# Patient Record
Sex: MALE | Race: AMERICAN INDIAN OR ALASKA NATIVE
[De-identification: names, ages, dates, MRNs, and addresses within clinical notes are randomized per-mention and may not be internally consistent; named-entity substitution may affect disease eponyms.]

---

## 2019-01-08 NOTE — EMERGENCY DEPARTMENT REPORT
Minor Respiratory





- HPI


Chief Complaint: Adult Asthma


Stated Complaint: SOB


Time Seen by Provider: 01/08/19 10:42


Duration: 3 Days


Pain Location: Chest


Severity: mild


Minor Respiratory: Yes Able to Tolerate Fluids, Yes Cough, No Rhinorrhea, No 

Sore Throat, No Ear Pain, No Sick Contacts, No Hemoptysis, No Chest Pain, No 

Shortness of Breath, No Fever


Other History: Patient is a 38-year-old -American male who comes in today

complaining of shortness of breath and wheezing for 2 days.  Patient has a past 

medical history of asthma and he is out of his inhalers and has been for some 

time.  Patient denies any other major medical problems.  And is on no daily 

medications.no fever.





ED Review of Systems


ROS: 


Stated complaint: SOB


Other details as noted in HPI





Comment: All other systems reviewed and negative


Constitutional: see HPI.  denies: chills


Eyes: denies: eye pain


ENT: as per HPI.  denies: throat pain


Respiratory: no symptoms reported, see HPI, cough, shortness of breath, wheezing


Cardiovascular: denies: chest pain, palpitations


Endocrine: denies: flushing


Gastrointestinal: denies: abdominal pain


Genitourinary: denies: urgency


Musculoskeletal: denies: back pain


Skin: denies: lesions


Neurological: denies: weakness


Psychiatric: denies: depression


Hematological/Lymphatic: denies: easy bleeding





ED Past Medical Hx





- Past Medical History


Previous Medical History?: Yes


Hx Asthma: Yes





- Surgical History


Past Surgical History?: Yes


Additional Surgical History: bilat femur surgery





- Social History


Smoking Status: Current Every Day Smoker





- Medications


Home Medications: 


                                Home Medications











 Medication  Instructions  Recorded  Confirmed  Last Taken  Type


 


ALBUTEROL Inhaler (OR & NICU) 2 puff IH QID PRN #1 inhalation 01/08/19  Unknown 

Rx





[ProAir HFA Inhaler]     


 


Amoxicillin 500 mg PO BID #20 capsule 01/08/19  Unknown Rx


 


Cetirizine HCl [ZyrTEC] 10 mg PO DAILY #20 capsule 01/08/19  Unknown Rx


 


Fluticasone [Flonase] 1 spray NS QDAY #1 bottle 01/08/19  Unknown Rx


 


predniSONE [Deltasone] 20 mg PO DAILY #5 tablet 01/08/19  Unknown Rx














Minor Respiratory Exam





- Exam


General: 


Vital signs noted. No distress. Alert and acting appropriately.





HEENT: Yes Moist Mucous Membranes, No Pharyngeal Erythema, No Pharyngeal 

Exudates, No Rhinorrhea, No Conjuctival Injection, No Frontal Tenderness, No 

Maxillary Tenderness


Ear: Neither TM Bulge, Neither TM Erythema, Neither EAC Pain, Neither EAC 

Discharge


Neck: Yes Supple, No Adenopathy


Lungs: Yes Good Air Exchange, Yes Wheezes (b), Yes Cough, No Ronchi, No Stridor,

 No Labored Respirations, No Retractions, No Use of Accessory Muscles, No Other 

Abnormal Lung Sounds


Heart: Yes Regular (hr 90 on exam), No Murmur


Abdomen: Yes Normal Bowel Sounds, No Tenderness, No Peritoneal Signs


Skin: No Rash, No Edema


Neurologic: 


Alert and oriented, no deficits.








Musculoskeletal: 


Unremarkable.











ED Course


                                   Vital Signs











  01/08/19





  10:17


 


Temperature 98.8 F


 


Pulse Rate 115 H


 


Respiratory 22





Rate 


 


Blood Pressure 137/90


 


O2 Sat by Pulse 97





Oximetry 














ED Medical Decision Making





- Medical Decision Making





non toxic


ambulatory


taking po


no fever








- Differential Diagnosis


asthma w or wo ae


Critical care attestation.: 


If time is entered above; I have spent that time in minutes in the direct care 

of this critically ill patient, excluding procedure time.








ED Disposition


Clinical Impression: 


 Asthma with acute exacerbation, URTI (acute upper respiratory infection)





Disposition: DC-01 TO HOME OR SELFCARE


Is pt being admited?: No


Does the pt Need Aspirin: No


Condition: Stable


Instructions:  Asthma (ED)


Additional Instructions: 


rest





hydrate well





meds as ordered today





follow up pcp in 48 hours if not better


referral below


Referrals: 


BARI GO MD [Primary Care Provider] - 3-5 Days


EVELIN SAAVEDRA MD [Staff Physician] - 3-5 Days


Time of Disposition: 10:56

## 2021-09-23 ENCOUNTER — HOSPITAL ENCOUNTER (EMERGENCY)
Dept: HOSPITAL 5 - ED | Age: 41
Discharge: HOME | End: 2021-09-23
Payer: COMMERCIAL

## 2021-09-23 VITALS — SYSTOLIC BLOOD PRESSURE: 134 MMHG | DIASTOLIC BLOOD PRESSURE: 78 MMHG

## 2021-09-23 DIAGNOSIS — F17.200: ICD-10-CM

## 2021-09-23 DIAGNOSIS — Z98.890: ICD-10-CM

## 2021-09-23 DIAGNOSIS — M54.16: Primary | ICD-10-CM

## 2021-09-23 DIAGNOSIS — M54.5: ICD-10-CM

## 2021-09-23 DIAGNOSIS — J45.909: ICD-10-CM

## 2021-09-23 PROCEDURE — 99282 EMERGENCY DEPT VISIT SF MDM: CPT

## 2021-09-23 PROCEDURE — 96372 THER/PROPH/DIAG INJ SC/IM: CPT

## 2021-09-29 ENCOUNTER — HOSPITAL ENCOUNTER (OUTPATIENT)
Dept: HOSPITAL 5 - XRAY | Age: 41
Discharge: HOME | End: 2021-09-29
Attending: ORTHOPAEDIC SURGERY
Payer: COMMERCIAL

## 2021-09-29 DIAGNOSIS — M79.651: Primary | ICD-10-CM

## 2021-09-29 DIAGNOSIS — M79.652: ICD-10-CM

## 2021-09-29 NOTE — XRAY REPORT
BILATERAL FEMURS AP VIEWS



INDICATION / CLINICAL INFORMATION:

PAIN IN UNSPECIFIED THIGH



COMPARISON:

None available.

 

FINDINGS:



BONES and JOINT(S): No acute fracture or subluxation. Bilateral internal fixation of the femurs appea
rs intact and in good position. There are old fractures of the proximal third of the left femoral sha
ft and distal third of the right femoral shaft. No significant arthritis.

SOFT TISSUES: No significant abnormality.



ADDITIONAL FINDINGS: None.



IMPRESSION:

1. No acute findings.

2. Additional findings as above.



Signer Name: Jimbo Rodas MD 

Signed: 9/29/2021 2:46 PM

Workstation Name: [id number]

## 2021-10-15 LAB
HCT VFR BLD CALC: 41.4 % (ref 35.5–45.6)
HGB BLD-MCNC: 14.3 GM/DL (ref 11.8–15.2)
MCHC RBC AUTO-ENTMCNC: 35 % (ref 32–34)
MCV RBC AUTO: 94 FL (ref 84–94)
PLATELET # BLD: 298 K/MM3 (ref 140–440)
RBC # BLD AUTO: 4.4 M/MM3 (ref 3.65–5.03)

## 2021-10-15 NOTE — ANESTHESIA CONSULTATION
Anesthesia Consult and Med Hx


Date of service: 10/15/21





- Airway


Anesthetic Teeth Evaluation: Good, Dentures (upper)


ROM Head & Neck: Adequate


Mental/Hyoid Distance: Adequate


Mallampati Class: Class III


Intubation Access Assessment: Possibly Difficult





- Pulmonary Exam


CTA: Yes





- Cardiac Exam


Cardiac Exam: RRR





- Pre-Operative Health Status


ASA Pre-Surgery Classification: ASA2


Proposed Anesthetic Plan: General





- Pulmonary


Hx Smoking: Yes (1/2PPD)


Hx Asthma: Yes (no inhaler use in >1month; triggered by dust, allergies)


Hx Respiratory Symptoms: No





- Cardiovascular System


Hx Hypertension: No


Hx Heart Attack/AMI: No


Hx Percutaneous Transluminal Coronary Angioplasty (PTCA): No


Hx Cardia Arrhythmia: No





- Central Nervous System


CVA: No


Hx Back Pain: Yes





- Endocrine


Hx Renal Disease: No


Hx Liver Disease: No


Hx Insulin Dependent Diabetes: No


Hx Non-Insulin Dependent Diabetes: No


Hx Thyroid Disease: No





- Other Systems


Hx Alcohol Use: Yes (6 beers per day)


Hx Obesity: No





- Additional Comments


Anesthesia Medical History Comments: No hx anesthetic complications.

## 2021-10-18 ENCOUNTER — HOSPITAL ENCOUNTER (OUTPATIENT)
Dept: HOSPITAL 5 - OR | Age: 41
Discharge: HOME | End: 2021-10-18
Attending: ORTHOPAEDIC SURGERY
Payer: COMMERCIAL

## 2021-10-18 VITALS — DIASTOLIC BLOOD PRESSURE: 94 MMHG | SYSTOLIC BLOOD PRESSURE: 150 MMHG

## 2021-10-18 DIAGNOSIS — J45.909: ICD-10-CM

## 2021-10-18 DIAGNOSIS — Z20.822: ICD-10-CM

## 2021-10-18 DIAGNOSIS — Z79.899: ICD-10-CM
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
## 2021-10-18 DIAGNOSIS — F17.210: ICD-10-CM

## 2021-10-18 DIAGNOSIS — Z98.890: ICD-10-CM

## 2021-10-18 DIAGNOSIS — T84.84XA: Primary | ICD-10-CM

## 2021-10-18 PROCEDURE — 20680 REMOVAL OF IMPLANT DEEP: CPT

## 2021-10-18 PROCEDURE — 36415 COLL VENOUS BLD VENIPUNCTURE: CPT

## 2021-10-18 PROCEDURE — U0003 INFECTIOUS AGENT DETECTION BY NUCLEIC ACID (DNA OR RNA); SEVERE ACUTE RESPIRATORY SYNDROME CORONAVIRUS 2 (SARS-COV-2) (CORONAVIRUS DISEASE [COVID-19]), AMPLIFIED PROBE TECHNIQUE, MAKING USE OF HIGH THROUGHPUT TECHNOLOGIES AS DESCRIBED BY CMS-2020-01-R: HCPCS

## 2021-10-18 PROCEDURE — 81025 URINE PREGNANCY TEST: CPT

## 2021-10-18 PROCEDURE — 93005 ELECTROCARDIOGRAM TRACING: CPT

## 2021-10-18 PROCEDURE — 73560 X-RAY EXAM OF KNEE 1 OR 2: CPT

## 2021-10-18 PROCEDURE — 85027 COMPLETE CBC AUTOMATED: CPT

## 2021-10-18 NOTE — XRAY REPORT
INTRAOPERATIVE FLUOROSCOPY



INDICATION / CLINICAL INFORMATION:

RIGHT KNEE PAIN.. 



TECHNIQUE:

Intraoperative spot images were obtained during the procedure.



FINDINGS:

Intraoperative fluoroscopy images



See operative/procedure note by performing physician for full details.



Fluoroscopy Time: 0.7 minutes. Fluoroscopy Images: 2.



Signer Name: Yfn Navas MD 

Signed: 10/18/2021 10:47 AM

Workstation Name: [id number]

## 2021-10-18 NOTE — OPERATIVE REPORT
Operative Report


Operative Report: 


Preop diagnosis : Retained painful hardware , right distal femur





Postop diagnosis: Retained painful hardware, right distal femur





Procedure: Movable of 2 (most distal)  locking screws, right distal femur





Surgeon: Terry Shirley MD





Anesthesia: General with LMA





Details of operative technique: The patient was prepared in the same-day holding

area.  He was then brought to the operating room where he underwent general 

anesthesia utilizing an LMA.  He was placed in the supine position and the right

distal thigh and knee were prepped and draped in the usual sterile fashion with 

ChloraPrep solution.  A tourniquet was applied but was not utilized.  A timeout 

was then called by the circulating nurse and once again the correct site was 

identified.


This Patient had undergone a cephalocaudal intramedullary nail fixation for a 

distal femoral shaft fracture in the remote past.  There were 2 small incisions 

in the distal thigh for the most distal of the locking screws.  There was a 

total of 4 distal locking screws 2 in the metadiaphysis which were perfectly 

normal (and left alone) and 2 in the more distal locking holes at the epicondyle

area of the distal femur.  It was these 2 the most distal,  which were prominent

both medially and laterally over the epicondylar area.  Preop radiographs show 

that the screws had migrated possibly a few millimeters which now caused them to

impinge on the soft tissue in the epicondylar area of the knee where there is a 

very thin fibrofatty layer.


Utilizing C arm images during the case, the most distal screw was addressed 

first and a small incision was made over the lateral epicondylar area in line 

with the head of the cannulated cancellous screw.  This appeared to be the 

longer screw and also appeared to be a 5.5 mm diameter screw that was removed 

after localizing with C arm from the lateral approach.


The proximal epicondylar screw had been placed from medial to lateral and so a 

slightly larger incision approximately 2 cm was made on the medial side of the 

epicondyle expanding the original incision by half.  Stasis was secured with the

Bovie.  Fibrous tissue and bone remodeling from the protrusion of the head of 

the screw which created a fibro-osseous roof.  Once localized utilizing C arm 

the screw was removed with a pair of large pliers.  C arm fluoro images 

confirmed both distal screws that were located in the epicondylar area of the 

distal femur had been successfully removed.


The wounds were then irrigated copiously with normal saline.  Lateral wound 

required only skin closure utilizing 2-0 nylon interrupted sutures.  The medial 

incision was more extensive with more soft tissue dissection and therefore the 

subcu layer was closed with 0 Vicryl suture and the skin was reapproximated with

both a mattress and an  interrupted suture utilizing 2-0 nylon.  Sterile 

compressive dressings were placed over both incisions including Xeroform dressi

ngs.  These were secured with an ABD pad and an Ace wrap.


Patient was then awakened and taken to recovery room in excellent condition.





Estimated blood loss: Less than 20 cc





Drains: None


Complications: None


Tourniquet time: Not utilized

## 2021-10-21 NOTE — ELECTROCARDIOGRAPH REPORT
[facility]

                                       

Test Date:    2021-10-15               Test Time:    09:36:13

Pat Name:     SHEKHAR KOTHARI           Department:   

Patient ID:   [id number]          Room:          

Gender:       M                        Technician:   SC

:          1980               Requested By: FRANKI MUÑOZ

Order Number: [id number]              Reading MD:   Lakhwinder Madrid

                                 Measurements

Intervals                              Axis          

Rate:         86                       P:            83

VA:           137                      QRS:          76

QRSD:         93                       T:            50

QT:           357                                    

QTc:          428                                    

                           Interpretive Statements

Sinus rhythm

Probable left atrial enlargement

Otherwise, normal ECG

No previous ECG available for comparison

Electronically Signed On 10- 9:45:50 EDT by Lakhwinder Madrid

## 2022-07-26 ENCOUNTER — HOSPITAL ENCOUNTER (EMERGENCY)
Dept: HOSPITAL 5 - ED | Age: 42
LOS: 1 days | Discharge: LEFT BEFORE DISCHARGE | End: 2022-07-27
Payer: COMMERCIAL

## 2022-07-26 VITALS — DIASTOLIC BLOOD PRESSURE: 107 MMHG | SYSTOLIC BLOOD PRESSURE: 169 MMHG

## 2022-07-26 DIAGNOSIS — F10.20: ICD-10-CM

## 2022-07-26 DIAGNOSIS — M79.673: Primary | ICD-10-CM

## 2022-07-26 DIAGNOSIS — F17.200: ICD-10-CM

## 2022-07-26 DIAGNOSIS — J45.909: ICD-10-CM

## 2022-07-26 PROCEDURE — 99281 EMR DPT VST MAYX REQ PHY/QHP: CPT

## 2022-07-26 NOTE — EMERGENCY DEPARTMENT REPORT
ED General Adult HPI





- General


Stated complaint: NUMBNESS IN BOTH FEET


PUI?: No


Time Seen by Provider: 07/26/22 11:10


Source: patient


Mode of arrival: Ambulatory


Limitations: No Limitations





- History of Present Illness


Initial comments: 





Patient is a 42-year-old male that comes to the emergency room with bilateral 

foot numbness.  He was a patient of Dr. Edwards and had surgery in October for 

retained hardware in his right hip.  See EMR.  He states that since that time 

this numbness of his feet has increased.  He denies incontinence.  He is 

ambulatory to the ER.  On no home medications.  Does not have a primary care 

doctor.


-: Gradual


Consistency: constant


Improves with: none


Worsens with: none


Associated Symptoms: denies other symptoms


Treatments Prior to Arrival: none





- Related Data


                                    Allergies











Allergy/AdvReac Type Severity Reaction Status Date / Time


 


No Known Allergies Allergy   Verified 10/13/21 12:44














ED Review of Systems


ROS: 


Stated complaint: NUMBNESS IN BOTH FEET


Other details as noted in HPI





Comment: All other systems reviewed and negative





ED Past Medical Hx





- Past Medical History


Previous Medical History?: Yes


Hx Headaches / Migraines: Yes (MIGRAINES)


Hx Asthma: Yes


Hx HIV: No





- Surgical History


Past Surgical History?: Yes


Additional Surgical History: bilat femur surgery





- Social History


Smoking Status: Current Every Day Smoker


Substance Use Type: Alcohol





ED Physical Exam





- General


General appearance: alert, in no apparent distress





- Head


Head exam: Present: atraumatic, normocephalic





- Eye


Eye exam: Present: normal appearance





- ENT


ENT exam: Present: mucous membranes moist





- Neck


Neck exam: Present: normal inspection





- Respiratory


Respiratory exam: Present: normal lung sounds bilaterally.  Absent: respiratory 

distress





- Cardiovascular


Cardiovascular Exam: Present: regular rate, normal rhythm.  Absent: systolic 

murmur, diastolic murmur, rubs, gallop





- GI/Abdominal


GI/Abdominal exam: Present: soft, normal bowel sounds





- Rectal


Rectal exam: Present: deferred





- Extremities Exam


Extremities exam: Present: normal inspection





- Back Exam


Back exam: Present: normal inspection





- Neurological Exam


Neurological exam: Present: alert, oriented X3





- Psychiatric


Psychiatric exam: Present: normal affect, normal mood





- Skin


Skin exam: Present: warm, dry, intact, normal color.  Absent: rash





ED Course


                                   Vital Signs











  07/26/22





  11:10


 


Temperature 98.5 F


 


Pulse Rate 93 H


 


Respiratory 18





Rate 


 


Blood Pressure 169/107


 


O2 Sat by Pulse 99





Oximetry 











Critical care attestation.: 


If time is entered above; I have spent that time in minutes in the direct care 

of this critically ill patient, excluding procedure time.








ED Disposition


Clinical Impression: 


 Foot pain





Disposition: 07 LEFT WITHOUT BEING SEEN


Is pt being admited?: No


Does the pt Need Aspirin: No


Condition: Stable


Referrals: 


PRIMARY CARE,MD [Primary Care Provider] - 3-5 Days

## 2024-11-06 NOTE — EMERGENCY DEPARTMENT REPORT
ED Back Pain/Injury HPI





- General


Chief Complaint: Extremity Problem,Nontraumatic


Stated Complaint: LOSING FEELINGS IN BOTH LEGS


Time Seen by Provider: 09/23/21 14:55


Source: patient


Limitations: No Limitations





- History of Present Illness


Initial Comments: 





This is a 41-year-old male nontoxic, well nourished in appearance, no acute 

signs of distress presents to the ED with c/o of acute on chronic lower back 

pain with radiation to bilateral legs.  Patient stated had a accident in the 

year 2007 and had bilateral surgical procedures to femur.  Patient otherwise 

denies any other injuries or symptoms.  Patient stated was just seen a provider 

in urgent care and was given medicartions which made his symptoms better and was

diagnosed radiculopathy and has a orthopedic appointment this Monday in 4 days. 

Patient stated taht he went to work today and developed pains to lower back with

radiation to bilateral legs. Patient states has history of sciatica nerve pain 

which is similar symptoms as today.  Patient states that pain radiates through 

to his bilateral lower extremity.  Patient denies any new injuries or trauma.  

Denies any bladder or bowel instability.  Patient denies any urinary symptoms.  

Denies any fever, chills, nausea, vomiting, headache, stiff neck, chest pain or 

shortness of breath.  Denies any allergies.  


MD Complaint: back pain


-: days(s)


Similar Symptoms Previously: Yes


Place: work


Radiation: left leg, right leg


Severity: mild


Severity scale (0 -10): 3


Quality: aching


Consistency: intermittent


Improves With: immobilization, sitting upright


Worsens With: movement, walking


Associated Symptoms: denies other symptoms.  denies: confusion, weakness, chest 

pain, numbness, difficulty walking, cough, difficulty urinating, diaphoresis, 

incontinence, fever/chills, constipation, headaches, abdominal pain, malaise, 

nausea/vomiting, rash, seizure, shortness of breath, syncope





- Related Data


                                  Previous Rx's











 Medication  Instructions  Recorded  Last Taken  Type


 


Albuterol Mdi (or & Nicu Only) 2 puff IH QID PRN #1 inhalation 01/08/19 Unknown 

Rx





[ProAir HFA Inhaler]    


 


Amoxicillin 500 mg PO BID #20 capsule 01/08/19 Unknown Rx


 


Cetirizine HCl [ZyrTEC] 10 mg PO DAILY #20 capsule 01/08/19 Unknown Rx


 


Fluticasone [Flonase] 1 spray NS QDAY #1 bottle 01/08/19 Unknown Rx


 


predniSONE [Deltasone] 20 mg PO DAILY #5 tablet 01/08/19 Unknown Rx











                                    Allergies











Allergy/AdvReac Type Severity Reaction Status Date / Time


 


No Known Allergies Allergy   Verified 09/29/16 17:51














ED Review of Systems


ROS: 


Stated complaint: LOSING FEELINGS IN BOTH LEGS


Other details as noted in HPI





Comment: All other systems reviewed and negative


Constitutional: denies: chills, fever


Eyes: denies: eye pain, eye discharge, vision change


ENT: denies: ear pain, throat pain


Respiratory: denies: cough, shortness of breath, wheezing


Cardiovascular: denies: chest pain, palpitations


Endocrine: no symptoms reported


Gastrointestinal: denies: abdominal pain, nausea, diarrhea


Genitourinary: denies: urgency, dysuria


Musculoskeletal: back pain.  denies: joint swelling, arthralgia


Skin: denies: rash, lesions


Neurological: denies: headache, weakness, paresthesias


Psychiatric: denies: anxiety, depression


Hematological/Lymphatic: denies: easy bleeding, easy bruising





ED Past Medical Hx





- Past Medical History


Previous Medical History?: Yes


Hx Asthma: Yes





- Surgical History


Additional Surgical History: bilat femur surgery





- Social History


Smoking Status: Current Every Day Smoker





- Medications


Home Medications: 


                                Home Medications











 Medication  Instructions  Recorded  Confirmed  Last Taken  Type


 


Albuterol Mdi (or & Nicu Only) 2 puff IH QID PRN #1 inhalation 01/08/19  Unknown

 Rx





[ProAir HFA Inhaler]     


 


Amoxicillin 500 mg PO BID #20 capsule 01/08/19  Unknown Rx


 


Cetirizine HCl [ZyrTEC] 10 mg PO DAILY #20 capsule 01/08/19  Unknown Rx


 


Fluticasone [Flonase] 1 spray NS QDAY #1 bottle 01/08/19  Unknown Rx


 


predniSONE [Deltasone] 20 mg PO DAILY #5 tablet 01/08/19  Unknown Rx














ED Physical Exam





- General


Limitations: No Limitations


General appearance: alert, in no apparent distress





- Head


Head exam: Present: atraumatic, normocephalic





- Eye


Eye exam: Present: normal appearance





- Neck


Neck exam: Present: normal inspection, full ROM.  Absent: lymphadenopathy





- Respiratory


Respiratory exam: Present: normal lung sounds bilaterally.  Absent: respiratory 

distress, wheezes, rales, rhonchi, stridor, chest wall tenderness, accessory 

muscle use, decreased breath sounds, prolonged expiratory





- Cardiovascular


Cardiovascular Exam: Present: normal rhythm, normal heart sounds.  Absent: 

irregular rhythm, systolic murmur, diastolic murmur, rubs, gallop





- GI/Abdominal


GI/Abdominal exam: Present: soft, normal bowel sounds.  Absent: distended, 

tenderness, guarding, rebound, rigid, diminished bowel sounds





- Extremities Exam


Extremities exam: Present: normal inspection, full ROM, normal capillary refill.

  Absent: tenderness, pedal edema, joint swelling, calf tenderness





- Back Exam


Back exam: Present: normal inspection, full ROM, paraspinal tenderness (lumbar 

paraspinal).  Absent: tenderness, CVA tenderness (R), CVA tenderness (L), muscle

 spasm, vertebral tenderness, rash noted





- Neurological Exam


Neurological exam: Present: alert, oriented X3, normal gait





- Psychiatric


Psychiatric exam: Present: normal affect, normal mood





- Skin


Skin exam: Present: warm, dry, intact, normal color.  Absent: rash





ED Course


                                   Vital Signs











  09/23/21 09/23/21





  14:35 15:56


 


Temperature 98.2 F 


 


Pulse Rate 100 H 74


 


Respiratory 18 16





Rate  


 


Blood Pressure 147/103 


 


Blood Pressure  134/78





[Right]  


 


O2 Sat by Pulse 99 98





Oximetry  














- Reevaluation(s)


Reevaluation #1: 





09/23/21 15:37


Patient is speaking in full sentences with no signs of distress noted.





ED Medical Decision Making





- Medical Decision Making





This is a 41-year-old male that presents with chronic back pain with 

radiculopathy.  Patient is stable was examined by me.  There is no midline 

spinal tenderness.  There is no cauda equina syndrome during examination.  No 

bladder or bowel instability. Patient received Toradol 60 mg IM and prednisone 

in the ED which stated that his symptoms has resolved and subsided.  Patient 

stated still has medication that was prescribed from urgent care. Patient was 

referred to Follow-up with a orthopedic doctor that he has scheduled already for

 or if symptoms worsen and continue return to emergency room as soon as 

possible.  At time of discharge, the patient does not seem toxic or ill in 

appearance.  No acute signs of distress noted.  Patient agrees to discharge 

treatment plan of care.  No further questions noted by the patient.





This chart is dictated with using Dragon Dictation Program


Critical care attestation.: 


If time is entered above; I have spent that time in minutes in the direct care 

of this critically ill patient, excluding procedure time.








ED Disposition


Clinical Impression: 


 Lumbar radiculopathy, chronic





Chronic back pain


Qualifiers:


 Back pain location: low back pain Back pain laterality: bilateral Sciatica 

presence: with sciatica Sciatica laterality: bilateral sciatica Qualified 

Code(s): M54.42 - Lumbago with sciatica, left side; M54.41 - Lumbago with 

sciatica, right side; G89.29 - Other chronic pain





Disposition: 01 HOME / SELF CARE / HOMELESS


Is pt being admited?: No


Does the pt Need Aspirin: No


Condition: Stable


Instructions:  Chronic Back Pain, Lumbosacral Radiculopathy


Additional Instructions: 


Follow-up with your orthopedic doctor that you currently have scheduled already 

or if symptoms worsen such as bladder or bowel stability, chest pain, short of 

breath, numbness or tingling sensation in extremities, headache, dizziness, 

visual changes, nausea vomiting, or abdominal pain, return back to emergency 

room as was possible.








Continue taking medications as prescribed to you by your previous doctor.








No physical activity that extremity until cleared by orthopedic doctor


Referrals: 


WALLACE QUAN MD [Staff Physician] - 


ADAN BARRERA MD [Staff Physician] - 


PRIMARY CARE,MD [Primary Care Provider] - 


Forms:  Work/School Release Form(ED)


Time of Disposition: 16:01
risk factors